# Patient Record
Sex: FEMALE | Race: WHITE | Employment: OTHER | URBAN - METROPOLITAN AREA
[De-identification: names, ages, dates, MRNs, and addresses within clinical notes are randomized per-mention and may not be internally consistent; named-entity substitution may affect disease eponyms.]

---

## 2018-01-23 PROBLEM — M54.2 NECK PAIN: Status: ACTIVE | Noted: 2018-01-23

## 2018-01-23 PROBLEM — M54.50 CHRONIC BILATERAL LOW BACK PAIN WITHOUT SCIATICA: Status: ACTIVE | Noted: 2018-01-23

## 2018-01-23 PROBLEM — G89.29 CHRONIC BILATERAL LOW BACK PAIN WITHOUT SCIATICA: Status: ACTIVE | Noted: 2018-01-23

## 2019-06-10 ENCOUNTER — TELEPHONE (OUTPATIENT)
Dept: ADMINISTRATIVE | Age: 59
End: 2019-06-10

## 2019-06-10 NOTE — TELEPHONE ENCOUNTER
Patient called to schedule appointment with Dr. Leon Turner for right hip pain. Call transferred to [facility].

## 2019-06-11 NOTE — TELEPHONE ENCOUNTER
Patient called back to schedule appointment with Dr. Ezequiel Jane for right hip pain. Call transferred to [facility].

## 2019-06-17 DIAGNOSIS — M25.551 CHRONIC RIGHT HIP PAIN: Primary | ICD-10-CM

## 2019-06-17 DIAGNOSIS — G89.29 CHRONIC RIGHT HIP PAIN: Primary | ICD-10-CM

## 2019-06-18 ENCOUNTER — OFFICE VISIT (OUTPATIENT)
Dept: ORTHOPEDIC SURGERY | Age: 59
End: 2019-06-18
Payer: MEDICARE

## 2019-06-18 ENCOUNTER — HOSPITAL ENCOUNTER (OUTPATIENT)
Dept: GENERAL RADIOLOGY | Age: 59
Discharge: HOME | End: 2019-06-20
Payer: MEDICARE

## 2019-06-18 VITALS
HEART RATE: 57 BPM | DIASTOLIC BLOOD PRESSURE: 89 MMHG | BODY MASS INDEX: 49.69 KG/M2 | WEIGHT: 270 LBS | HEIGHT: 62 IN | SYSTOLIC BLOOD PRESSURE: 167 MMHG

## 2019-06-18 DIAGNOSIS — M51.36 LUMBAR DEGENERATIVE DISC DISEASE: ICD-10-CM

## 2019-06-18 DIAGNOSIS — M25.551 RIGHT HIP PAIN: ICD-10-CM

## 2019-06-18 DIAGNOSIS — G89.29 CHRONIC RIGHT HIP PAIN: ICD-10-CM

## 2019-06-18 DIAGNOSIS — M70.61 GREATER TROCHANTERIC BURSITIS OF RIGHT HIP: Primary | ICD-10-CM

## 2019-06-18 DIAGNOSIS — M54.40 ACUTE RIGHT-SIDED LOW BACK PAIN WITH SCIATICA, SCIATICA LATERALITY UNSPECIFIED: ICD-10-CM

## 2019-06-18 DIAGNOSIS — M25.551 CHRONIC RIGHT HIP PAIN: ICD-10-CM

## 2019-06-18 PROCEDURE — 99213 OFFICE O/P EST LOW 20 MIN: CPT | Performed by: PHYSICIAN ASSISTANT

## 2019-06-18 PROCEDURE — 3017F COLORECTAL CA SCREEN DOC REV: CPT | Performed by: PHYSICIAN ASSISTANT

## 2019-06-18 PROCEDURE — 2500000003 HC RX 250 WO HCPCS

## 2019-06-18 PROCEDURE — 20610 DRAIN/INJ JOINT/BURSA W/O US: CPT | Performed by: PHYSICIAN ASSISTANT

## 2019-06-18 PROCEDURE — 73502 X-RAY EXAM HIP UNI 2-3 VIEWS: CPT

## 2019-06-18 PROCEDURE — 1036F TOBACCO NON-USER: CPT | Performed by: PHYSICIAN ASSISTANT

## 2019-06-18 PROCEDURE — G8417 CALC BMI ABV UP PARAM F/U: HCPCS | Performed by: PHYSICIAN ASSISTANT

## 2019-06-18 PROCEDURE — 99212 OFFICE O/P EST SF 10 MIN: CPT

## 2019-06-18 PROCEDURE — 6360000002 HC RX W HCPCS

## 2019-06-18 PROCEDURE — G8427 DOCREV CUR MEDS BY ELIG CLIN: HCPCS | Performed by: PHYSICIAN ASSISTANT

## 2019-06-18 RX ORDER — MAGNESIUM GLUCONATE 27 MG(500)
1000 TABLET ORAL 2 TIMES DAILY
COMMUNITY
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
## 2019-06-19 RX ORDER — TRIAMCINOLONE ACETONIDE 40 MG/ML
40 INJECTION, SUSPENSION INTRA-ARTICULAR; INTRAMUSCULAR ONCE
Status: COMPLETED | OUTPATIENT
Start: 2019-06-19 | End: 2019-06-19

## 2019-06-19 RX ORDER — LIDOCAINE HYDROCHLORIDE 10 MG/ML
4 INJECTION, SOLUTION INFILTRATION; PERINEURAL ONCE
Status: COMPLETED | OUTPATIENT
Start: 2019-06-19 | End: 2019-06-19

## 2019-06-19 RX ORDER — BUPIVACAINE HYDROCHLORIDE 2.5 MG/ML
4 INJECTION, SOLUTION EPIDURAL; INFILTRATION; INTRACAUDAL ONCE
Status: COMPLETED | OUTPATIENT
Start: 2019-06-19 | End: 2019-06-19

## 2019-06-19 RX ADMIN — LIDOCAINE HYDROCHLORIDE 4 ML: 10 INJECTION, SOLUTION INFILTRATION; PERINEURAL at 15:27

## 2019-06-19 RX ADMIN — BUPIVACAINE HYDROCHLORIDE 10 MG: 2.5 INJECTION, SOLUTION EPIDURAL; INFILTRATION; INTRACAUDAL at 15:26

## 2019-06-19 RX ADMIN — TRIAMCINOLONE ACETONIDE 40 MG: 40 INJECTION, SUSPENSION INTRA-ARTICULAR; INTRAMUSCULAR at 15:28

## 2019-07-22 ENCOUNTER — OFFICE VISIT (OUTPATIENT)
Dept: PHYSICAL MEDICINE AND REHAB | Age: 59
End: 2019-07-22
Payer: MEDICARE

## 2019-07-22 VITALS
BODY MASS INDEX: 50.61 KG/M2 | SYSTOLIC BLOOD PRESSURE: 128 MMHG | HEIGHT: 62 IN | HEART RATE: 56 BPM | DIASTOLIC BLOOD PRESSURE: 74 MMHG | WEIGHT: 275 LBS

## 2019-07-22 DIAGNOSIS — M70.61 TROCHANTERIC BURSITIS OF RIGHT HIP: ICD-10-CM

## 2019-07-22 DIAGNOSIS — M21.70 LEG LENGTH DISCREPANCY: ICD-10-CM

## 2019-07-22 DIAGNOSIS — M54.50 CHRONIC BILATERAL LOW BACK PAIN WITHOUT SCIATICA: Primary | ICD-10-CM

## 2019-07-22 DIAGNOSIS — E66.01 OBESITY, MORBID, BMI 50 OR HIGHER (HCC): ICD-10-CM

## 2019-07-22 DIAGNOSIS — G89.29 CHRONIC BILATERAL LOW BACK PAIN WITHOUT SCIATICA: Primary | ICD-10-CM

## 2019-07-22 PROCEDURE — 3017F COLORECTAL CA SCREEN DOC REV: CPT | Performed by: PHYSICAL MEDICINE & REHABILITATION

## 2019-07-22 PROCEDURE — G8427 DOCREV CUR MEDS BY ELIG CLIN: HCPCS | Performed by: PHYSICAL MEDICINE & REHABILITATION

## 2019-07-22 PROCEDURE — 1036F TOBACCO NON-USER: CPT | Performed by: PHYSICAL MEDICINE & REHABILITATION

## 2019-07-22 PROCEDURE — G8417 CALC BMI ABV UP PARAM F/U: HCPCS | Performed by: PHYSICAL MEDICINE & REHABILITATION

## 2019-07-22 PROCEDURE — 99204 OFFICE O/P NEW MOD 45 MIN: CPT | Performed by: PHYSICAL MEDICINE & REHABILITATION

## 2019-07-22 RX ORDER — CITALOPRAM 40 MG/1
40 TABLET ORAL DAILY
COMMUNITY

## 2019-07-22 RX ORDER — OMEPRAZOLE 40 MG/1
40 CAPSULE, DELAYED RELEASE ORAL DAILY
COMMUNITY

## 2019-07-22 RX ORDER — ALPRAZOLAM 0.25 MG/1
0.25 TABLET ORAL 4 TIMES DAILY PRN
COMMUNITY

## 2019-07-23 ENCOUNTER — TELEPHONE (OUTPATIENT)
Dept: PHYSICAL MEDICINE AND REHAB | Age: 59
End: 2019-07-23

## 2019-07-23 NOTE — TELEPHONE ENCOUNTER
----- Message from Shawna Stiles DO sent at 7/23/2019  8:52 AM EDT -----  Test results are essentially normal please notify patient. There is only a 1 mm shortening of the left leg which is not significant.

## 2020-02-04 ENCOUNTER — OFFICE VISIT (OUTPATIENT)
Dept: ORTHOPEDIC SURGERY | Age: 60
End: 2020-02-04
Payer: MEDICARE

## 2020-02-04 ENCOUNTER — HOSPITAL ENCOUNTER (OUTPATIENT)
Dept: GENERAL RADIOLOGY | Age: 60
Discharge: HOME | End: 2020-02-06
Payer: MEDICARE

## 2020-02-04 VITALS
HEIGHT: 61 IN | DIASTOLIC BLOOD PRESSURE: 88 MMHG | HEART RATE: 65 BPM | SYSTOLIC BLOOD PRESSURE: 168 MMHG | BODY MASS INDEX: 50.98 KG/M2 | WEIGHT: 270 LBS

## 2020-02-04 PROCEDURE — G8484 FLU IMMUNIZE NO ADMIN: HCPCS | Performed by: NURSE PRACTITIONER

## 2020-02-04 PROCEDURE — 6360000002 HC RX W HCPCS

## 2020-02-04 PROCEDURE — 1036F TOBACCO NON-USER: CPT | Performed by: NURSE PRACTITIONER

## 2020-02-04 PROCEDURE — 3017F COLORECTAL CA SCREEN DOC REV: CPT | Performed by: NURSE PRACTITIONER

## 2020-02-04 PROCEDURE — G8417 CALC BMI ABV UP PARAM F/U: HCPCS | Performed by: NURSE PRACTITIONER

## 2020-02-04 PROCEDURE — 20611 DRAIN/INJ JOINT/BURSA W/US: CPT

## 2020-02-04 PROCEDURE — 2500000003 HC RX 250 WO HCPCS

## 2020-02-04 PROCEDURE — 99213 OFFICE O/P EST LOW 20 MIN: CPT | Performed by: NURSE PRACTITIONER

## 2020-02-04 PROCEDURE — 99213 OFFICE O/P EST LOW 20 MIN: CPT | Performed by: ORTHOPAEDIC SURGERY

## 2020-02-04 PROCEDURE — G8428 CUR MEDS NOT DOCUMENT: HCPCS | Performed by: NURSE PRACTITIONER

## 2020-02-04 PROCEDURE — 73502 X-RAY EXAM HIP UNI 2-3 VIEWS: CPT

## 2020-02-04 RX ORDER — LIDOCAINE HYDROCHLORIDE 10 MG/ML
3 INJECTION, SOLUTION INFILTRATION; PERINEURAL ONCE
Status: COMPLETED | OUTPATIENT
Start: 2020-02-04 | End: 2020-02-04

## 2020-02-04 RX ORDER — BUPIVACAINE HYDROCHLORIDE 2.5 MG/ML
3 INJECTION, SOLUTION EPIDURAL; INFILTRATION; INTRACAUDAL ONCE
Status: COMPLETED | OUTPATIENT
Start: 2020-02-04 | End: 2020-02-04

## 2020-02-04 RX ORDER — TRIAMCINOLONE ACETONIDE 40 MG/ML
40 INJECTION, SUSPENSION INTRA-ARTICULAR; INTRAMUSCULAR ONCE
Status: COMPLETED | OUTPATIENT
Start: 2020-02-04 | End: 2020-02-04

## 2020-02-04 RX ADMIN — TRIAMCINOLONE ACETONIDE 40 MG: 40 INJECTION, SUSPENSION INTRA-ARTICULAR; INTRAMUSCULAR at 15:50

## 2020-02-04 RX ADMIN — BUPIVACAINE HYDROCHLORIDE 7.5 MG: 2.5 INJECTION, SOLUTION EPIDURAL; INFILTRATION; INTRACAUDAL at 15:49

## 2020-02-04 RX ADMIN — LIDOCAINE HYDROCHLORIDE 3 ML: 10 INJECTION, SOLUTION INFILTRATION; PERINEURAL at 15:49

## 2020-02-04 NOTE — PROGRESS NOTES
Assisted Erika VELASCO CNP with steroid injection of 3mL marcaine 0.25%, 3mL lidocaine 1%, 1cc Kenalog 40mg/mL into right hip. Pt tolerated procedure well. Verbal instructions given and questions answered.
Spent significant amount of time educating patient on high blood pressure. Patient admits that she checks her blood pressure frequently, and systolic often runs in the 200s. Explained to patient the importance of following up with her PCP and cardiologist. She states she sees Dr. Lynn Price in [place], [place]. She claims she has not seen cardiology or her PCP in a while. Patient disclosed that she is a diabetic but not taking her prescribed medications. Patient also admitted that her doctor wanted to put her on a statin for high cholesterol, but she does not want to take these either. Educated patient of the risk factors associated with her medical history. Highly encouraged patient to follow up with PCP and cardiology. Patient agreeable with plan. Call placed to patient's cardiologist at [phone]. No answer, left message on the nurse line to request call back. Call placed to patient's PCP. Spoke to Dr. Ritchie Ross nurse. She stated that the patient sees Mary Mcgill, and provided the phone number [phone]. Spoke to nurse FRANCISCO. Notified her of above information. She stated she will call patient tomorrow to schedule appointment. Cardiology office returned call. Spoke to Damon Serrato. Advised Stephanie of elevated blood pressures.
headaches. Psychiatric/Behavioral: Negative. OBJECTIVE:      Physical Examination:   General appearance: alert, well appearing, and in no distress,  normal appearing weight. No visible signs of trauma   Mental status: alert, oriented to person, place, and time, normal mood, behavior, speech, dress, motor activity, and thought processes  Abdomen: soft, nondistended  Resp:   resp easy and unlabored, no audible wheezes note, normal symmetrical expansion of both hemithoraces  Cardiac: distal pulses palpable, skin and extremities well perfused  Neurological: alert, oriented X3, normal speech, no focal findings or movement disorder noted, motor and sensory grossly normal bilaterally, normal muscle tone, no tremors, strength 5/5, normal gait and station  HEENT: normochephalic atraumatic, external ears and eyes normal, sclera normal, neck supple, no nasal discharge. Extremities:   peripheral pulses normal, no edema, redness or tenderness in the calves   Skin: normal coloration, no rashes or open wounds, no suspicious skin lesions noted  Psych: Affect euthymic   Musculoskeletal:   Extremity:  Right Lower Extremity  Skin clean dry and intact, without signs of infection  Mild edema noted to the right knee, no effusion  Compartments supple throughout thigh and leg,   Tender to palpation over the right trochanteric bursa, no fluctuance  Calf supple and nontender to palpation  Demonstrates active knee flexion/extension 0 to 100 degrees, ankle plantar/dorsiflexion/great toe extension. States sensation intact to touch in sural/deep peroneal/superficial peroneal/saphenous/posterior tibial nerve distributions to foot/ankle. Palpable dorsalis pedis and posterior tibialis pulses, cap refill brisk in toes, foot warm/perfused. BP (!) 168/88   Pulse 65   Ht 5' 1\" (1.549 m)   Wt 270 lb (122.5 kg)   BMI 51.02 kg/m²      XR: 2/4/20 x-rays of the right hip and pelvis demonstrating mild to moderate degenerative changes.

## 2020-02-04 NOTE — PATIENT INSTRUCTIONS
Continue weight bearing as tolerated  Trochanteric bursa injection to the right hip today  Will schedule an IR injection to the right hip, will need to be at least 2 weeks out from the injection today. Please follow up  With your primary care physician and cardiologist in regards to your BP today in the office  Follow up in 3 months  Call sooner with any problems or concerns    Patient Education        High Blood Pressure: Care Instructions  Overview    It's normal for blood pressure to go up and down throughout the day. But if it stays up, you have high blood pressure. Another name for high blood pressure is hypertension. Despite what a lot of people think, high blood pressure usually doesn't cause headaches or make you feel dizzy or lightheaded. It usually has no symptoms. But it does increase your risk of stroke, heart attack, and other problems. You and your doctor will talk about your risks of these problems based on your blood pressure. Your doctor will give you a goal for your blood pressure. Your goal will be based on your health and your age. Lifestyle changes, such as eating healthy and being active, are always important to help lower blood pressure. You might also take medicine to reach your blood pressure goal.  Follow-up care is a key part of your treatment and safety. Be sure to make and go to all appointments, and call your doctor if you are having problems. It's also a good idea to know your test results and keep a list of the medicines you take. How can you care for yourself at home? Medical treatment  · If you stop taking your medicine, your blood pressure will go back up. You may take one or more types of medicine to lower your blood pressure. Be safe with medicines. Take your medicine exactly as prescribed. Call your doctor if you think you are having a problem with your medicine. · Talk to your doctor before you start taking aspirin every day.  Aspirin can help certain people lower their example, call  911 if:    · You have symptoms of a heart attack. These may include:  ? Chest pain or pressure, or a strange feeling in the chest.  ? Sweating. ? Shortness of breath. ? Nausea or vomiting. ? Pain, pressure, or a strange feeling in the back, neck, jaw, or upper belly or in one or both shoulders or arms. ? Lightheadedness or sudden weakness. ? A fast or irregular heartbeat.     · You have symptoms of a stroke. These may include:  ? Sudden numbness, tingling, weakness, or loss of movement in your face, arm, or leg, especially on only one side of your body. ? Sudden vision changes. ? Sudden trouble speaking. ? Sudden confusion or trouble understanding simple statements. ? Sudden problems with walking or balance. ? A sudden, severe headache that is different from past headaches.     · You have severe back or belly pain.    Do not wait until your blood pressure comes down on its own. Get help right away.   Call your doctor now or seek immediate care if:    · Your blood pressure is much higher than normal (such as 180/120 or higher), but you don't have symptoms.     · You think high blood pressure is causing symptoms, such as:  ? Severe headache.  ? Blurry vision.    Watch closely for changes in your health, and be sure to contact your doctor if:    · Your blood pressure measures higher than your doctor recommends at least 2 times. That means the top number is higher or the bottom number is higher, or both.     · You think you may be having side effects from your blood pressure medicine. Where can you learn more? Go to [email or web address] org and sign in to your [employer] account. Enter D315 in the [employer] box to learn more about \"High Blood Pressure: Care Instructions. \"     If you do not have an account, please click on the \"Sign Up Now\" link. Current as of: April 9, 2019  Content Version: 12.3  © 2110-6705 [employer].  Care instructions adapted

## 2020-02-19 ENCOUNTER — HOSPITAL ENCOUNTER (OUTPATIENT)
Dept: INTERVENTIONAL RADIOLOGY/VASCULAR | Age: 60
Discharge: HOME | End: 2020-02-21
Payer: MEDICARE

## 2020-02-19 VITALS
TEMPERATURE: 97.7 F | SYSTOLIC BLOOD PRESSURE: 158 MMHG | DIASTOLIC BLOOD PRESSURE: 63 MMHG | OXYGEN SATURATION: 95 % | RESPIRATION RATE: 18 BRPM | HEART RATE: 64 BPM

## 2020-02-19 PROCEDURE — 20610 DRAIN/INJ JOINT/BURSA W/O US: CPT

## 2020-02-19 PROCEDURE — 77002 NEEDLE LOCALIZATION BY XRAY: CPT

## 2020-02-19 PROCEDURE — 7100000010 HC PHASE II RECOVERY - FIRST 15 MIN

## 2020-02-19 PROCEDURE — 6360000004 HC RX CONTRAST MEDICATION: Performed by: PHYSICIAN ASSISTANT

## 2020-02-19 PROCEDURE — 7100000011 HC PHASE II RECOVERY - ADDTL 15 MIN

## 2020-02-19 PROCEDURE — 2500000003 HC RX 250 WO HCPCS: Performed by: PHYSICIAN ASSISTANT

## 2020-02-19 PROCEDURE — 6360000002 HC RX W HCPCS: Performed by: PHYSICIAN ASSISTANT

## 2020-02-19 RX ORDER — TRIAMCINOLONE ACETONIDE 40 MG/ML
INJECTION, SUSPENSION INTRA-ARTICULAR; INTRAMUSCULAR
Status: COMPLETED | OUTPATIENT
Start: 2020-02-19 | End: 2020-02-19

## 2020-02-19 RX ORDER — LIDOCAINE HYDROCHLORIDE 10 MG/ML
INJECTION, SOLUTION INFILTRATION; PERINEURAL
Status: COMPLETED | OUTPATIENT
Start: 2020-02-19 | End: 2020-02-19

## 2020-02-19 RX ORDER — LIDOCAINE HYDROCHLORIDE 20 MG/ML
INJECTION, SOLUTION INFILTRATION; PERINEURAL
Status: COMPLETED | OUTPATIENT
Start: 2020-02-19 | End: 2020-02-19

## 2020-02-19 RX ORDER — BUPIVACAINE HYDROCHLORIDE 2.5 MG/ML
INJECTION, SOLUTION EPIDURAL; INFILTRATION; INTRACAUDAL
Status: COMPLETED | OUTPATIENT
Start: 2020-02-19 | End: 2020-02-19

## 2020-02-19 RX ADMIN — TRIAMCINOLONE ACETONIDE 40 MG: 40 INJECTION, SUSPENSION INTRA-ARTICULAR; INTRAMUSCULAR at 12:37

## 2020-02-19 RX ADMIN — LIDOCAINE HYDROCHLORIDE 1 ML: 10 INJECTION, SOLUTION INFILTRATION; PERINEURAL at 12:37

## 2020-02-19 RX ADMIN — IOPAMIDOL 2 ML: 612 INJECTION, SOLUTION INTRAVENOUS at 12:39

## 2020-02-19 RX ADMIN — BUPIVACAINE HYDROCHLORIDE 1 ML: 2.5 INJECTION, SOLUTION EPIDURAL; INFILTRATION; INTRACAUDAL; PERINEURAL at 12:37

## 2020-02-19 RX ADMIN — LIDOCAINE HYDROCHLORIDE 13 ML: 20 INJECTION, SOLUTION INFILTRATION; PERINEURAL at 12:34

## 2020-02-19 ASSESSMENT — PAIN - FUNCTIONAL ASSESSMENT
PAIN_FUNCTIONAL_ASSESSMENT: 0-10

## 2020-02-19 ASSESSMENT — PAIN DESCRIPTION - DESCRIPTORS: DESCRIPTORS: CONSTANT;NAGGING;DISCOMFORT

## 2020-02-19 NOTE — BRIEF OP NOTE
Brief Postoperative Note  ______________________________________________________________      Interventional Radiology Joint Injection Brief Procedure Note    Patient Name: Nikhil Lara   YOB: 1960  Medical Record Number: [id number]  Date of Procedure: 2/19/20  Room/Bed: Room/bed info not found    Preoperative diagnosis: right Hip Joint pain. Postoperative diagnosis: Same. Procedure: Arthrography and fluoroscopic-guided therapeutic injection of right Hip Joint    Anesthesia: Approximately 10 mL lidocaine administered subcutaneously. Performed by: TRENTON Ireland under on-site supervision by Gold Oliveros II, MD    Estimated blood loss: Minimal    Complications: None    Implants: None    Procedure details: The site was prepped and draped in sterile fashion. A preprocedure timeout was called. After selecting and marking a suitable access site on the skin, 1% lidocaine was subcutaneously administered for local anesthesia. Under fluoroscopic guidance, a 20-gauge spinal needle was inserted into the right Hip Joint capsule. Iodinated contrast was injected confirming needle tip position within the joint. Through the needle, Kenalog (40mg/ml) 1.0 mL, Marcaine 0.25% 1.0 mL, and lidocaine 1% 1.0 mL were injected into the joint. The needle was removed, and the puncture site was covered with a dressing. There were no complications.     Electronically signed by TRENTON Ireland   DD: 2/19/20  3:57 PM

## 2020-07-23 ENCOUNTER — TELEPHONE (OUTPATIENT)
Dept: ADMINISTRATIVE | Age: 60
End: 2020-07-23

## 2020-07-23 NOTE — TELEPHONE ENCOUNTER
Patient called stated she had a referral sent over for her to see Dr. Irineo Carl for a torn left meniscus, I did not see referral in her chart, she can be reached at [phone].

## 2020-07-23 NOTE — TELEPHONE ENCOUNTER
Attempted to call patient. No answer, left voicemail. Patient established with Dr. Leah Vuong,maria elena to schedule on next available.

## 2020-07-28 ENCOUNTER — OFFICE VISIT (OUTPATIENT)
Dept: ORTHOPEDIC SURGERY | Age: 60
End: 2020-07-28
Payer: MEDICARE

## 2020-07-28 ENCOUNTER — HOSPITAL ENCOUNTER (OUTPATIENT)
Dept: GENERAL RADIOLOGY | Age: 60
Discharge: HOME | End: 2020-07-30
Payer: MEDICARE

## 2020-07-28 VITALS — HEIGHT: 62 IN | TEMPERATURE: 98.2 F | WEIGHT: 276 LBS | BODY MASS INDEX: 50.79 KG/M2

## 2020-07-28 PROCEDURE — 3017F COLORECTAL CA SCREEN DOC REV: CPT | Performed by: PHYSICIAN ASSISTANT

## 2020-07-28 PROCEDURE — G8427 DOCREV CUR MEDS BY ELIG CLIN: HCPCS | Performed by: PHYSICIAN ASSISTANT

## 2020-07-28 PROCEDURE — 99213 OFFICE O/P EST LOW 20 MIN: CPT | Performed by: PHYSICIAN ASSISTANT

## 2020-07-28 PROCEDURE — 99212 OFFICE O/P EST SF 10 MIN: CPT | Performed by: PHYSICIAN ASSISTANT

## 2020-07-28 PROCEDURE — G8417 CALC BMI ABV UP PARAM F/U: HCPCS | Performed by: PHYSICIAN ASSISTANT

## 2020-07-28 PROCEDURE — 73560 X-RAY EXAM OF KNEE 1 OR 2: CPT

## 2020-07-28 PROCEDURE — 1036F TOBACCO NON-USER: CPT | Performed by: PHYSICIAN ASSISTANT

## 2020-07-30 NOTE — PROGRESS NOTES
Chief Complaint   Patient presents with    Pain      Follow up Left torn meniscus. Referred by PCP. Reports pain outer lateral aspect. Maintains ace wrap during waking hours. Tony Callahan is an 60 y.o. female who presents to the office today for left knee pain. Patient previously established with Dr. Jewel Mohan for previous right foot trauma, underwent right subtalar fusion. Patient has been having worsening left knee pain for several months, patient describes this as a ripping and tearing sensation to the outside of the knee, worse with activity and weightbearing. Patient does reports mechanical symptoms to the knee but no sensation of locking or giving out. Patient has tried acetaminophen and her gabapentin for pain control without relief. Patient has been wearing ACE wrap to the left knee for support. Presents without assistive device in the office today. Patient states her pain management provider has injected the knee which gave mild relief but this was very short lived. Review of Systems -   General ROS: negative for - chills, fatigue, fever or night sweats  Respiratory ROS: no cough, shortness of breath, or wheezing  Cardiovascular ROS: no chest pain or dyspnea on exertion  Gastrointestinal ROS: no abdominal pain, change in bowel habits, or black or bloody stools  Genitourinary: no hematuria, dysuria, or incontinence   Musculoskeletal ROS:see above  Neurological ROS: no TIA or stroke symptoms       OBJECTIVE:   Alert and oriented X 3, no acute distress, respirations easy and unlabored with no audible wheezes, skin warm and dry, speech and dress appropriate for noted age, affect euthymic. Extremity:  Right Knee exam  Skin intact. No erythema/induration/fluctuence at knee. No effusion noted  Negative ballotment  Patella tracks normally  Negative patellar grind test and  Negative J sign.    Mild crepitus with flexion and extension of the knee  lateral joint line pain with palpation  Stable to varus and valgus at 0 and 30 degrees of flexion. Negative McMurrays for mechanical symptoms but reproduces pain  1+ laxity with anterior drawer compared with contralateral side, negative posterior drawer  Active range of motion 0-110 without pain. Compartments soft and compressible throughout leg. Calf soft and nontender with palpation    Demonstrates active ankle plantar/dorsiflexion/great toe extension. Sensation intact to light touch sural/deep peroneal/superficial peroneal/saphenous/posterior tibial nerve distributions to foot/ankle. Palpable dorsalis pedis and posterior tibialis pulses. XR: 7/28/20 AP/lateral WB views of the left knee demonstrates no acute fracture or dislocation, there is no notable joint space narrowing or degenerative changes    Temp 98.2 °F (36.8 °C)   Ht 5' 2\" (1.575 m)   Wt 276 lb (125.2 kg)   BMI 50.48 kg/m²     ASSESSMENT:     Diagnosis Orders   1. Knee instability, left  MRI KNEE LEFT WO CONTRAST       PLAN:  Patient seen with Dr. Kwabena Monge today  Discussed symptoms reported as well as sensation of instability on exam today  Discussed has tried conservative treatments including injection  Discussed MRI of the knee to further evaluate, if knee arthroscopy indicated will refer to partner   In the mean time continue ACE wrap for support and compression, NSAID/Acetaminophen PRN  Will call with MRI results    Electronically signed by Ilsa Swain PA-C on 7/30/2020 at 10:41 AM  Note: This report was completed using computerize voiced recognition software. Every effort has been made to ensure accuracy; however, inadvertent computerized transcription errors may be present.

## 2020-08-11 ENCOUNTER — TELEPHONE (OUTPATIENT)
Dept: ORTHOPEDIC SURGERY | Age: 60
End: 2020-08-11

## 2020-08-11 RX ORDER — ACETAMINOPHEN 500 MG
1000 TABLET ORAL 3 TIMES DAILY PRN
Qty: 180 TABLET | Refills: 0 | Status: SHIPPED | OUTPATIENT
Start: 2020-08-11 | End: 2020-09-10

## 2020-08-13 ENCOUNTER — HOSPITAL ENCOUNTER (OUTPATIENT)
Dept: MRI IMAGING | Age: 60
Discharge: HOME | End: 2020-08-15
Payer: MEDICARE

## 2020-08-13 PROCEDURE — 73721 MRI JNT OF LWR EXTRE W/O DYE: CPT

## 2020-08-13 NOTE — TELEPHONE ENCOUNTER
Call placed to pt. To verify that medication was obtained  that she had requested. CHINYERE at this time.

## 2020-08-14 ENCOUNTER — TELEPHONE (OUTPATIENT)
Dept: ORTHOPEDIC SURGERY | Age: 60
End: 2020-08-14

## 2020-08-14 NOTE — TELEPHONE ENCOUNTER
Spoke to patient. Discussed results of the MRI and referral to Dr. Nayan Moon. Patient agreeable to plan.      Electronically signed by VENANCIO Ocampo CNP on 8/14/2020 at 3:27 PM

## 2020-08-14 NOTE — TELEPHONE ENCOUNTER
Elissa Severance called and she had her MRI done yesterday; she is calling for the results.        Electronically signed by Sravan Elizondo on 8/14/2020 at 3:02 PM

## 2020-09-02 ENCOUNTER — OFFICE VISIT (OUTPATIENT)
Dept: ORTHOPEDIC SURGERY | Age: 60
End: 2020-09-02
Payer: MEDICARE

## 2020-09-02 VITALS — TEMPERATURE: 97.8 F | WEIGHT: 270 LBS | HEIGHT: 62 IN | BODY MASS INDEX: 49.69 KG/M2

## 2020-09-02 PROCEDURE — 3017F COLORECTAL CA SCREEN DOC REV: CPT | Performed by: ORTHOPAEDIC SURGERY

## 2020-09-02 PROCEDURE — 1036F TOBACCO NON-USER: CPT | Performed by: ORTHOPAEDIC SURGERY

## 2020-09-02 PROCEDURE — G8428 CUR MEDS NOT DOCUMENT: HCPCS | Performed by: ORTHOPAEDIC SURGERY

## 2020-09-02 PROCEDURE — 99203 OFFICE O/P NEW LOW 30 MIN: CPT | Performed by: ORTHOPAEDIC SURGERY

## 2020-09-02 PROCEDURE — G8417 CALC BMI ABV UP PARAM F/U: HCPCS | Performed by: ORTHOPAEDIC SURGERY

## 2020-09-02 RX ORDER — HYDROCODONE BITARTRATE AND ACETAMINOPHEN 5; 325 MG/1; MG/1
TABLET ORAL
COMMUNITY
Start: 2020-08-21 | End: 2021-09-29

## 2020-09-02 NOTE — PROGRESS NOTES
Chief Complaint   Patient presents with    Knee Pain     ( L ) knee, bothered pt for several years. Pain since July has been significant, saw Ortho Trauma. Had a cortisone inj from her pain clinic dr last year with minimal relief. C/o sharp pain, instability when needing to pivot     Results     MRI in chart        SUBJECTIVE: Kristina Galvin is a 60 y.o. female who presents to office due to the above chief complaint. She reports that at the beginning of July 2020 she stepped wrong and immediately felt pain in her left knee. She sought evaluation at the emergency department where an initial work-up was performed. She was referred to her PCP and Ortho trauma and was subsequently referred to our clinic for evaluation. She reports that her knee has progressively been improving since July and has been doing particularly well this past week. She is mainly been using an over-the-counter ointment for pain relief. Denies numbness, paresthesias, or any other orthopedic complaints this time. Review of Systems   Constitutional: Negative for fever, chills, diaphoresis, appetite change and fatigue. HENT: Negative for dental issues, hearing loss and tinnitus. Negative for congestion, sinus pressure, sneezing, sore throat. Negative for headache. Eyes: Negative for visual disturbance, blurred and double vision. Negative for pain, discharge, redness and itching  Respiratory: Negative for cough, shortness of breath and wheezing. Cardiovascular: Negative for chest pain, palpitations and leg swelling. No dyspnea on exertion   Gastrointestinal:   Negative for nausea, vomiting, abdominal pain, diarrhea, constipation  or black or bloody. Hematologic\Lymphatic:  negative for bleeding, petechiae,   Genitourinary: Negative for hematuria and difficulty urinating. Musculoskeletal: Negative for neck pain and stiffness. Negative for back pain, see HPI  Skin: Negative for pallor, rash and wound.    Neurological: Negative for dizziness, tremors, seizures, weakness, light-headedness, no TIA or stroke symptoms. No numbness and headaches. Psychiatric/Behavioral: Negative. OBJECTIVE:      Physical Examination:   General appearance: alert, well appearing, and in no distress,  normal appearing weight. No visible signs of trauma   Mental status: alert, oriented to person, place, and time, normal mood, behavior, speech, dress, motor activity, and thought processes  Abdomen: soft, nondistended  Resp:   resp easy and unlabored, no audible wheezes note, normal symmetrical expansion of both hemithoraces  Cardiac: distal pulses palpable, skin and extremities well perfused  Neurological: alert, oriented X3, normal speech, no focal findings or movement disorder noted, motor and sensory grossly normal bilaterally, normal muscle tone, no tremors, strength 5/5, normal gait and station  HEENT: normochephalic atraumatic, external ears and eyes normal, sclera normal, neck supple, no nasal discharge. Extremities:   peripheral pulses normal, no edema, redness or tenderness in the calves   Skin: normal coloration, no rashes or open wounds, no suspicious skin lesions noted  Psych: Affect euthymic   Musculoskeletal:   Extremity:  left lower extremity:  · No joint effusion appreciated  · Compartments soft and compressible  · +PF/DF/EHL  · +2/4 DP & PT pulses, Brisk Cap refill, Toes warm and perfused  · Distal sensation grossly intact to Peroneals, Sural, Saphenous, and tibial nrs  · Knee flexion 4/5  · Knee extension 4/5  · Knee flexion AROM 120 degrees  · Knee extension AROM 0 degrees    Temp 97.8 °F (36.6 °C) (Infrared)   Ht 5' 2\" (1.575 m)   Wt 270 lb (122.5 kg)   BMI 49.38 kg/m²      XR: 9/2/20   X-ray bilateral knee/left knee: ORIF hardware present right knee/lower extremity. No evidence of loosening; hardware appears to be well fixed.   Mild knee joint space narrowing bilaterally with medial joint space narrowing greater than lateral joint space narrowing bilaterally. Mild patellofemoral arthritis of the left knee with osteophytes present anteriorly. No other fractures, dislocations or other osseous abnormalities appreciated. MRI: 8/13/2020  MRI left knee: Radial tear present posterior medial horn of the meniscus. ACL/PCL appear to be intact. mild osteoarthritis present about the knee. ASSESSMENT:     Diagnosis Orders   1. Degenerative tear of posterior horn of medial meniscus, left  XR KNEE BILATERAL STANDING    XR KNEE LEFT (3 VIEWS)   2. Knee instability, left  XR KNEE BILATERAL STANDING    XR KNEE LEFT (3 VIEWS)       Discussion:  Discussed operative versus nonoperative management of meniscal tears. Patient will benefit from initial nonoperative management of her meniscal tear. Should her condition worsen or fail to improve in the coming weeks injections and other conservative management can be discussed as well as surgical management for her condition. All questions were sought and answered at today's visit. PLAN:  Home exercise program left lower extremity  Follow-up in 6 weeks  Discussed with Dr. Brice Mcgraw      Electronically signed by Rosemarie Crane DO on 9/2/2020 at 2:23 PM  Note: This report was completed using computerChubbies Shorts voiced recognition software. Every effort has been made to ensure accuracy; however, inadvertent computerized transcription errors may be present. Staff Addendum    I have seen and evaluated the patient and agree with the assessment and plan as documented by the orthopaedic surgery resident. I have performed the key components of the history and physical examination and concur with the findings and plan, and have made changes where appropriate/necessary.         Scooby Wolf MD  [address] [place] [address]

## 2020-10-03 NOTE — PROGRESS NOTES
levothyroxine (SYNTHROID) 25 MCG tablet Take 25 mcg by mouth Daily.  LORazepam (ATIVAN) 0.5 MG tablet Take 0.5 mg by mouth every 6 hours as needed.  loratadine (CLARITIN REDITABS) 10 MG dissolvable tablet Take 10 mg by mouth daily.  lisinopril (PRINIVIL;ZESTRIL) 20 MG tablet Take 20 mg by mouth daily.  docusate sodium 100 MG CAPS Take 100 mg by mouth 2 times daily. 30 capsule 2    famotidine (PEPCID) 20 MG tablet Take 1 tablet by mouth 2 times daily. 30 tablet 2    metoprolol (LOPRESSOR) 25 MG tablet Take 25 mg by mouth 2 times daily.  furosemide (LASIX) 40 MG tablet Take 40 mg by mouth daily. No current facility-administered medications for this visit. Review of Systems - For review of systems, positive symptoms are underlined and negative findings are not underlined. General: chills, fatigue, fever, malaise, night sweats, weight gain,  weight loss. Psychological: anxiety, depression, suicidal ideation, sleep disturbances, behavioral disorder, difficulty concentrating, disorientation, hallucinations, mood swings, obsessive thoughts, physical abuse,  sexual abuse. Ophthalmic: blurry vision, decreased vision, double vision, loss of vision, photophobia, use of corrective device. Ear Nose Throat: hearing loss, tinnitus, phonophobia, sensitivity to smells, vertigo, or vocal changes. Allergy/Immunology: seasonal allergies, watery eyes, itchy eyes, frequent infections. Hematological and Lymphatic: bleeding problems, blood clots, bruising,  yellowing of the skin, swollen lymph nodes. Endocrine:  polydypsia, polyuria, temperature intolerance. Respiratory: cough, shortness of breath, wheezing. Cardiovascular: syncope, chest pain, dyspnea on exertion, edema, irregular heartbeat,  palpitations. Gastrointestinal: abdominal pain, constipation, diarrhea,  decreased appetite, heartburn, hematemesis, melena, nausea, vomiting, stool incontinence, abnormal swallowing. Genito-Urinary: dysuria,
0

## 2020-10-14 ENCOUNTER — OFFICE VISIT (OUTPATIENT)
Dept: ORTHOPEDIC SURGERY | Age: 60
End: 2020-10-14
Payer: MEDICARE

## 2020-10-14 VITALS — TEMPERATURE: 97.9 F | HEIGHT: 62 IN | BODY MASS INDEX: 49.69 KG/M2 | WEIGHT: 270 LBS

## 2020-10-14 PROCEDURE — G8417 CALC BMI ABV UP PARAM F/U: HCPCS | Performed by: ORTHOPAEDIC SURGERY

## 2020-10-14 PROCEDURE — 99213 OFFICE O/P EST LOW 20 MIN: CPT | Performed by: ORTHOPAEDIC SURGERY

## 2020-10-14 PROCEDURE — 1036F TOBACCO NON-USER: CPT | Performed by: ORTHOPAEDIC SURGERY

## 2020-10-14 PROCEDURE — 3017F COLORECTAL CA SCREEN DOC REV: CPT | Performed by: ORTHOPAEDIC SURGERY

## 2020-10-14 PROCEDURE — G8484 FLU IMMUNIZE NO ADMIN: HCPCS | Performed by: ORTHOPAEDIC SURGERY

## 2020-10-14 PROCEDURE — G8428 CUR MEDS NOT DOCUMENT: HCPCS | Performed by: ORTHOPAEDIC SURGERY

## 2020-10-14 NOTE — PROGRESS NOTES
Chief Complaint   Patient presents with    Follow-up     Degenerative tear of posterior horn of medial meniscus     Knee Pain     ( L ) knee, sees pain management, apparently she has been getting HA injections x 2 weeks, has 1 more. Unable to name the medication. She is also doing PT a couple times a week    Shoulder Pain     ( L ) shoulder, x July. Pain in the joint radiating into the bicep. Having PT done, also had a cortisone inj from her PCP a couple of weeks ago. No numbness/tingling. SUBJECTIVE: Juan Antonio Bui is a 63 y.o. female who presents to office due to the above chief complaint. She continues to have left knee pain, however has seen Pain Management for visco injections into her left knee. She has one more injection this week. She also complains of left shoulder pain today that started about two months ago. Denies numbness, paresthesias, or any other orthopedic complaints this time. Review of Systems   Constitutional: Negative for fever, chills, diaphoresis, appetite change and fatigue. HENT: Negative for dental issues, hearing loss and tinnitus. Negative for congestion, sinus pressure, sneezing, sore throat. Negative for headache. Eyes: Negative for visual disturbance, blurred and double vision. Negative for pain, discharge, redness and itching  Respiratory: Negative for cough, shortness of breath and wheezing. Cardiovascular: Negative for chest pain, palpitations and leg swelling. No dyspnea on exertion   Gastrointestinal:   Negative for nausea, vomiting, abdominal pain, diarrhea, constipation  or black or bloody. Hematologic\Lymphatic:  negative for bleeding, petechiae,   Genitourinary: Negative for hematuria and difficulty urinating. Musculoskeletal: Negative for neck pain and stiffness. Negative for back pain, see HPI  Skin: Negative for pallor, rash and wound. Neurological: Negative for dizziness, tremors, seizures, weakness, light-headedness, no TIA or stroke symptoms. No numbness and headaches. Psychiatric/Behavioral: Negative. OBJECTIVE:      Physical Examination:   General appearance: alert, well appearing, and in no distress,  normal appearing weight. No visible signs of trauma   Mental status: alert, oriented to person, place, and time, normal mood, behavior, speech, dress, motor activity, and thought processes  Abdomen: soft, nondistended  Resp:   resp easy and unlabored, no audible wheezes note, normal symmetrical expansion of both hemithoraces  Cardiac: distal pulses palpable, skin and extremities well perfused  Neurological: alert, oriented X3, normal speech, no focal findings or movement disorder noted, motor and sensory grossly normal bilaterally, normal muscle tone, no tremors, strength 5/5, normal gait and station  HEENT: normochephalic atraumatic, external ears and eyes normal, sclera normal, neck supple, no nasal discharge.    Extremities:   peripheral pulses normal, no edema, redness or tenderness in the calves   Skin: normal coloration, no rashes or open wounds, no suspicious skin lesions noted  Psych: Affect euthymic   Musculoskeletal:   Extremity:  left lower extremity:  · No joint effusion appreciated  · Compartments soft and compressible  · +PF/DF/EHL  · +2/4 DP & PT pulses, Brisk Cap refill, Toes warm and perfused  · Distal sensation grossly intact to Peroneals, Sural, Saphenous, and tibial nrs  · Knee ROM -5 to 110    Left upper extremity:  · Skin intact circumferentially  · +Neer's, +Nunn, +Impingement  · Tenderness over biceps tendon  · Compartments soft and compressible  · +AIN/PIN/Ulnar nerve function intact grossly  · +Radial pulse, Brisk Cap refill, hand warm and perfused  · Sensation intact to touch in radial/ulnar/median nerve distributions to hand        Temp 97.9 °F (36.6 °C) (Infrared)   Ht 5' 2\" (1.575 m)   Wt 270 lb (122.5 kg)   BMI 49.38 kg/m²      XR: 10/14/20  Left shoulder Xray- demonstrates no acute fractures, minimal degenerative changes, minimal sclerosis     ASSESSMENT:     Diagnosis Orders   1. Left shoulder pain, unspecified chronicity  XR SHOULDER LEFT (MIN 2 VIEWS)       Discussion:  Discussed operative versus nonoperative management of her left shoulder and knee. She will continue with Pain Management for her last visco injection. She will continue therapy for her left knee and left shoulder. Should her condition worsen or fail to improve in the coming weeks, an MRI can be ordered for her left shoulder. All questions were sought and answered at today's visit. PLAN:  Home exercise program left lower and upper extremity  Call if would like MRI left shoulder  Discussed with Dr. Fritz Deng    Staff Addendum    I have seen and evaluated the patient and agree with the assessment and plan as documented by the orthopaedic surgery resident. I have performed the key components of the history and physical examination and concur with the findings and plan, and have made changes where appropriate/necessary. Agree with above. She is currently doing PT and had an injection of her shoulder. She will finish out the PT. If her symptoms persist or worsen we will order an MRI of the left shoulder.       Yair Amaya MD  [address]

## 2020-10-20 ENCOUNTER — TELEPHONE (OUTPATIENT)
Dept: ORTHOPEDIC SURGERY | Age: 60
End: 2020-10-20

## 2021-08-27 ENCOUNTER — TELEPHONE (OUTPATIENT)
Dept: ORTHOPEDIC SURGERY | Age: 61
End: 2021-08-27

## 2021-08-27 DIAGNOSIS — M25.551 RIGHT HIP PAIN: Primary | ICD-10-CM

## 2021-09-20 ENCOUNTER — TELEPHONE (OUTPATIENT)
Dept: ORTHOPEDIC SURGERY | Age: 61
End: 2021-09-20

## 2021-09-24 ENCOUNTER — TELEPHONE (OUTPATIENT)
Dept: ORTHOPEDIC SURGERY | Age: 61
End: 2021-09-24

## 2021-09-24 DIAGNOSIS — M79.671 RIGHT FOOT PAIN: Primary | ICD-10-CM

## 2021-09-29 ENCOUNTER — OFFICE VISIT (OUTPATIENT)
Dept: ORTHOPEDIC SURGERY | Age: 61
End: 2021-09-29
Payer: MEDICARE

## 2021-09-29 ENCOUNTER — HOSPITAL ENCOUNTER (OUTPATIENT)
Dept: GENERAL RADIOLOGY | Age: 61
Discharge: HOME | End: 2021-10-01
Payer: MEDICARE

## 2021-09-29 VITALS — TEMPERATURE: 98 F

## 2021-09-29 DIAGNOSIS — M79.671 RIGHT FOOT PAIN: ICD-10-CM

## 2021-09-29 DIAGNOSIS — M16.11 PRIMARY OSTEOARTHRITIS OF RIGHT HIP: ICD-10-CM

## 2021-09-29 DIAGNOSIS — M79.671 RIGHT FOOT PAIN: Primary | ICD-10-CM

## 2021-09-29 DIAGNOSIS — M25.551 RIGHT HIP PAIN: ICD-10-CM

## 2021-09-29 PROCEDURE — 1036F TOBACCO NON-USER: CPT | Performed by: ORTHOPAEDIC SURGERY

## 2021-09-29 PROCEDURE — G8417 CALC BMI ABV UP PARAM F/U: HCPCS | Performed by: ORTHOPAEDIC SURGERY

## 2021-09-29 PROCEDURE — 99212 OFFICE O/P EST SF 10 MIN: CPT | Performed by: ORTHOPAEDIC SURGERY

## 2021-09-29 PROCEDURE — 99214 OFFICE O/P EST MOD 30 MIN: CPT | Performed by: ORTHOPAEDIC SURGERY

## 2021-09-29 PROCEDURE — 3017F COLORECTAL CA SCREEN DOC REV: CPT | Performed by: ORTHOPAEDIC SURGERY

## 2021-09-29 PROCEDURE — G8427 DOCREV CUR MEDS BY ELIG CLIN: HCPCS | Performed by: ORTHOPAEDIC SURGERY

## 2021-09-29 PROCEDURE — 73502 X-RAY EXAM HIP UNI 2-3 VIEWS: CPT

## 2021-09-29 PROCEDURE — 73630 X-RAY EXAM OF FOOT: CPT

## 2021-09-29 RX ORDER — MELOXICAM 15 MG/1
15 TABLET ORAL 2 TIMES DAILY
COMMUNITY
Start: 2021-09-23

## 2021-09-29 RX ORDER — BACLOFEN 10 MG/1
5 TABLET ORAL 2 TIMES DAILY PRN
COMMUNITY
Start: 2021-09-14
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
## 2021-11-04 DIAGNOSIS — M25.551 RIGHT HIP PAIN: Primary | ICD-10-CM

## 2021-11-11 ENCOUNTER — HOSPITAL ENCOUNTER (OUTPATIENT)
Dept: INTERVENTIONAL RADIOLOGY/VASCULAR | Age: 61
Discharge: HOME | End: 2021-11-13
Payer: MEDICARE

## 2021-11-11 DIAGNOSIS — M16.11 PRIMARY OSTEOARTHRITIS OF RIGHT HIP: ICD-10-CM

## 2021-11-11 PROCEDURE — 2500000003 HC RX 250 WO HCPCS: Performed by: ORTHOPAEDIC SURGERY

## 2021-11-11 PROCEDURE — 2709999900 IR FLUORO GUIDED NEEDLE PLACEMENT

## 2021-11-11 PROCEDURE — 77002 NEEDLE LOCALIZATION BY XRAY: CPT

## 2021-11-11 PROCEDURE — 20610 DRAIN/INJ JOINT/BURSA W/O US: CPT | Performed by: RADIOLOGY

## 2021-11-11 PROCEDURE — 20610 DRAIN/INJ JOINT/BURSA W/O US: CPT

## 2021-11-11 PROCEDURE — 2500000003 HC RX 250 WO HCPCS: Performed by: RADIOLOGY

## 2021-11-11 PROCEDURE — 6360000004 HC RX CONTRAST MEDICATION: Performed by: RADIOLOGY

## 2021-11-11 PROCEDURE — 6360000002 HC RX W HCPCS: Performed by: ORTHOPAEDIC SURGERY

## 2021-11-11 PROCEDURE — 77002 NEEDLE LOCALIZATION BY XRAY: CPT | Performed by: RADIOLOGY

## 2021-11-11 RX ORDER — LIDOCAINE HYDROCHLORIDE 10 MG/ML
INJECTION, SOLUTION INFILTRATION; PERINEURAL
Status: COMPLETED | OUTPATIENT
Start: 2021-11-11 | End: 2021-11-11

## 2021-11-11 RX ORDER — LIDOCAINE HYDROCHLORIDE 20 MG/ML
INJECTION, SOLUTION INFILTRATION; PERINEURAL
Status: COMPLETED | OUTPATIENT
Start: 2021-11-11 | End: 2021-11-11

## 2021-11-11 RX ORDER — TRIAMCINOLONE ACETONIDE 40 MG/ML
INJECTION, SUSPENSION INTRA-ARTICULAR; INTRAMUSCULAR
Status: COMPLETED | OUTPATIENT
Start: 2021-11-11 | End: 2021-11-11

## 2021-11-11 RX ORDER — BUPIVACAINE HYDROCHLORIDE 2.5 MG/ML
INJECTION, SOLUTION EPIDURAL; INFILTRATION; INTRACAUDAL
Status: COMPLETED | OUTPATIENT
Start: 2021-11-11 | End: 2021-11-11

## 2021-11-11 RX ADMIN — IOPAMIDOL 1 ML: 755 INJECTION, SOLUTION INTRAVENOUS at 16:49

## 2021-11-11 RX ADMIN — LIDOCAINE HYDROCHLORIDE 3 ML: 20 INJECTION, SOLUTION INFILTRATION; PERINEURAL at 16:33

## 2021-11-11 RX ADMIN — TRIAMCINOLONE ACETONIDE 40 MG: 40 INJECTION, SUSPENSION INTRA-ARTICULAR; INTRAMUSCULAR at 16:35

## 2021-11-11 RX ADMIN — LIDOCAINE HYDROCHLORIDE 1 ML: 10 INJECTION, SOLUTION INFILTRATION; PERINEURAL at 16:37

## 2021-11-11 RX ADMIN — BUPIVACAINE HYDROCHLORIDE 1 ML: 2.5 INJECTION, SOLUTION EPIDURAL; INFILTRATION; INTRACAUDAL; PERINEURAL at 16:37

## 2021-11-11 NOTE — PROGRESS NOTES
Pt. Taken to angio room for right hip injection  Daughter signed consent  Patient placed supine on angio table for right hip injection. Area prepped and draped   Dr. Adela Gresham II in to start procedure   ordered medications injected with fluroscopy guidance. Procedure completed     Site cleansed and dressed with band aid. Pt.  Assisted off table safely  Dc home per w/discharge instructions

## 2022-04-21 ENCOUNTER — TELEPHONE (OUTPATIENT)
Dept: ADMINISTRATIVE | Age: 62
End: 2022-04-21

## 2022-04-25 NOTE — TELEPHONE ENCOUNTER
Pt last seen 9/1/2021; (right hip pain  no imaging / Patient called re:  rt foot injury/pain not subsiding     Lt heel pain. No new injury. 2nd issue: Requesting IR low back/rt hip pain. Routing to providers for guidance/scheduling rec.

## 2022-05-17 DIAGNOSIS — M25.551 RIGHT HIP PAIN: Primary | ICD-10-CM

## 2022-05-17 DIAGNOSIS — R52 PAIN: ICD-10-CM

## 2022-05-25 ENCOUNTER — HOSPITAL ENCOUNTER (OUTPATIENT)
Dept: GENERAL RADIOLOGY | Age: 62
Discharge: HOME | End: 2022-05-27
Payer: MEDICARE

## 2022-05-25 ENCOUNTER — OFFICE VISIT (OUTPATIENT)
Dept: ORTHOPEDIC SURGERY | Age: 62
End: 2022-05-25
Payer: MEDICARE

## 2022-05-25 DIAGNOSIS — M70.61 GREATER TROCHANTERIC BURSITIS OF RIGHT HIP: ICD-10-CM

## 2022-05-25 DIAGNOSIS — M16.11 PRIMARY OSTEOARTHRITIS OF RIGHT HIP: Primary | ICD-10-CM

## 2022-05-25 DIAGNOSIS — M77.32 HEEL SPUR, LEFT: ICD-10-CM

## 2022-05-25 DIAGNOSIS — M25.551 RIGHT HIP PAIN: ICD-10-CM

## 2022-05-25 DIAGNOSIS — R52 PAIN: ICD-10-CM

## 2022-05-25 PROCEDURE — 99212 OFFICE O/P EST SF 10 MIN: CPT

## 2022-05-25 PROCEDURE — 3017F COLORECTAL CA SCREEN DOC REV: CPT | Performed by: PHYSICIAN ASSISTANT

## 2022-05-25 PROCEDURE — G8421 BMI NOT CALCULATED: HCPCS | Performed by: PHYSICIAN ASSISTANT

## 2022-05-25 PROCEDURE — 99212 OFFICE O/P EST SF 10 MIN: CPT | Performed by: PHYSICIAN ASSISTANT

## 2022-05-25 PROCEDURE — 99214 OFFICE O/P EST MOD 30 MIN: CPT | Performed by: PHYSICIAN ASSISTANT

## 2022-05-25 PROCEDURE — G8427 DOCREV CUR MEDS BY ELIG CLIN: HCPCS | Performed by: PHYSICIAN ASSISTANT

## 2022-05-25 PROCEDURE — 73630 X-RAY EXAM OF FOOT: CPT

## 2022-05-25 PROCEDURE — 1036F TOBACCO NON-USER: CPT | Performed by: PHYSICIAN ASSISTANT

## 2022-05-25 PROCEDURE — 73502 X-RAY EXAM HIP UNI 2-3 VIEWS: CPT

## 2022-05-25 NOTE — PROGRESS NOTES
Patient here for a follow up on right hip and left heel pain. Patient states that the back of the left foot by the heel is just in a lot of pain, and that pain started after her last OV on 09/29/2021. Patient denies any recent falls or injuries. Patient states that the right hip is the same pain since her last visit. She states that after she had the IR injection done in November, she was feeling good and had no pain for a couple months. Patient states that since the injection has worn off, she is having difficulty getting in and out of chairs and trouble walking.        Electronically signed by Diego Rebollar MA on 5/25/2022 at 2:14 PM

## 2022-05-25 NOTE — PATIENT INSTRUCTIONS
You were ordered IR injection to your right hip by your Orthopedic Provider. If you do not hear from that department please contact: IR [phone]- [phone]    We can repeat injections to your R hip every 3 months if needed.  Please call the office if still having back pain after your injection and we can refer you to a spine specialist.

## 2022-05-25 NOTE — PROGRESS NOTES
Chief Complaint   Patient presents with    Follow-up     right hip and left heel         Subjective:  Gilbert Barker is following up in the office for right hip osteoarthritis. She last received a cortisone injection by interventional radiology about 6 months ago which lasted for several months before gradually wearing off. This gave her significant pain relief. Also states that she has chronic back pain and states that 1 previous right hip injection relieved a lot of her back pain, but states another injection helped with her hip pain, but not her back pain. She has not seen neurosurgery or physical medicine and rehab. Also complaining of left heel pain that is been chronic. She is still full weightbearing bilateral lower extremities. Denies radiculopathy or paresthesias bilaterally. No recent injuries. Review of Systems -  all pertinent positives and negatives in HPI. Objective:    General: Alert and oriented X 3, normocephalic atraumatic, external ears and eye normal, sclera clear, no acute distress, respirations easy and unlabored with no audible wheezes, skin warm and dry, speech and dress appropriate for noted age, affect euthymic. Extremity:  Bilateral Lower Extremity  Skin clean dry and intact, without signs of infection  Mild TTP globally at the L heel, nontender throughout the remainder of the foot and ankle   Mild TTP globally at the R hip, more so to posterior R hip with increased tenderness with passive IR. nontender to passive ER   No edema noted  Compartments supple throughout thigh and leg  Calf supple and nontender  Demonstrates active knee flexion/extension, ankle plantar/dorsiflexion/great toe extension. States sensation intact to touch in sural/deep peroneal/superficial peroneal/saphenous/posterior tibial nerve distributions to foot/ankle. Palpable dorsalis pedis and posterior tibialis pulses, cap refill brisk in toes, foot warm/perfused.              XR:   3 views of R hip and pelvis demonstrating mild R hip degenerative changes. No significant change in alignment. No acute fractures or dislocations or any other osseus abnormality identified. 3 views of R foot demonstrating calcaneal spurring, but otherwise no acute pathologic process. No acute fractures or dislocations or any other osseus abnormality identified. Assessment:   Diagnosis Orders   1. Primary osteoarthritis of right hip  IR FLUORO GUIDED NEEDLE PLACEMENT   2. Greater trochanteric bursitis of right hip     3. Heel spur, left  External Referral To Podiatry       Plan:   Reviewed x-rays with patient today in office    Weightbearing as tolerated bilateral lower extremities   Referral to IR for intra-articular right hip CSI today.  Please call the office if still having lumbar back pain after right hip injection we can refer to either [facility] or neurosurgery   Patient has left heel spurs. Patient given referral to podiatry. Follow up PRN    Electronically signed by Óscar Westfall PA-C on 5/25/2022 at 2:51 PM  Note: This report was completed using [employer] voiced recognition software. Every effort has been made to ensure accuracy; however, inadvertent computerized transcription errors may be present.

## 2022-06-03 ENCOUNTER — HOSPITAL ENCOUNTER (OUTPATIENT)
Dept: INTERVENTIONAL RADIOLOGY/VASCULAR | Age: 62
Discharge: HOME | End: 2022-06-05
Payer: MEDICARE

## 2022-06-03 DIAGNOSIS — M16.11 PRIMARY OSTEOARTHRITIS OF RIGHT HIP: ICD-10-CM

## 2022-06-03 PROCEDURE — 2500000003 HC RX 250 WO HCPCS: Performed by: RADIOLOGY

## 2022-06-03 PROCEDURE — 20610 DRAIN/INJ JOINT/BURSA W/O US: CPT | Performed by: RADIOLOGY

## 2022-06-03 PROCEDURE — 2709999900 IR FLUORO GUIDED NEEDLE PLACEMENT

## 2022-06-03 PROCEDURE — 6360000002 HC RX W HCPCS: Performed by: RADIOLOGY

## 2022-06-03 PROCEDURE — 6360000004 HC RX CONTRAST MEDICATION: Performed by: RADIOLOGY

## 2022-06-03 PROCEDURE — 77002 NEEDLE LOCALIZATION BY XRAY: CPT | Performed by: RADIOLOGY

## 2022-06-03 PROCEDURE — 77002 NEEDLE LOCALIZATION BY XRAY: CPT

## 2022-06-03 PROCEDURE — 20610 DRAIN/INJ JOINT/BURSA W/O US: CPT

## 2022-06-03 RX ORDER — LIDOCAINE HYDROCHLORIDE 10 MG/ML
INJECTION, SOLUTION INFILTRATION; PERINEURAL
Status: COMPLETED | OUTPATIENT
Start: 2022-06-03 | End: 2022-06-03

## 2022-06-03 RX ORDER — LIDOCAINE HYDROCHLORIDE 20 MG/ML
INJECTION, SOLUTION INFILTRATION; PERINEURAL
Status: COMPLETED | OUTPATIENT
Start: 2022-06-03 | End: 2022-06-03

## 2022-06-03 RX ORDER — BUPIVACAINE HYDROCHLORIDE 2.5 MG/ML
INJECTION, SOLUTION EPIDURAL; INFILTRATION; INTRACAUDAL
Status: COMPLETED | OUTPATIENT
Start: 2022-06-03 | End: 2022-06-03

## 2022-06-03 RX ORDER — TRIAMCINOLONE ACETONIDE 40 MG/ML
INJECTION, SUSPENSION INTRA-ARTICULAR; INTRAMUSCULAR
Status: COMPLETED | OUTPATIENT
Start: 2022-06-03 | End: 2022-06-03

## 2022-06-03 RX ADMIN — IOPAMIDOL 1 ML: 755 INJECTION, SOLUTION INTRAVENOUS at 13:52

## 2022-06-03 RX ADMIN — TRIAMCINOLONE ACETONIDE 40 MG: 40 INJECTION, SUSPENSION INTRA-ARTICULAR; INTRAMUSCULAR at 13:40

## 2022-06-03 RX ADMIN — BUPIVACAINE HYDROCHLORIDE 1 ML: 2.5 INJECTION, SOLUTION EPIDURAL; INFILTRATION; INTRACAUDAL; PERINEURAL at 13:40

## 2022-06-03 RX ADMIN — LIDOCAINE HYDROCHLORIDE 1 ML: 10 INJECTION, SOLUTION INFILTRATION; PERINEURAL at 13:40

## 2022-06-03 RX ADMIN — LIDOCAINE HYDROCHLORIDE 5 ML: 20 INJECTION, SOLUTION INFILTRATION; PERINEURAL at 13:39

## 2022-06-03 NOTE — BRIEF OP NOTE
Brief Postoperative Note    Lannie Boast  YOB: 1960  [id number]    Pre-operative Diagnosis and Procedure: right hip pain plan injection    Post-operative Diagnosis: Same    Anesthesia: Local    Estimated Blood Loss: < 10 cc    Surgeon: Leonarda BULLARD     Complications: none    Specimen obtained: none     Findings: none     Rachid Williamson II, MD   6/3/2022 1:34 PM

## 2022-06-03 NOTE — H&P
, Rfl:     gabapentin (NEURONTIN) 300 MG capsule, Take 300 mg by mouth 3 times daily . , Disp: , Rfl:     Ascorbic Acid (VITAMIN C) 500 MG tablet, Take 500 mg by mouth daily. , Disp: , Rfl:     vitamin D (CHOLECALCIFEROL) 1000 UNIT TABS tablet, Take 1,500 Units by mouth daily. , Disp: , Rfl:     Omega-3 Fatty Acids (FISH OIL) 1000 MG CAPS, Take 3,000 mg by mouth 3 times daily. , Disp: , Rfl:     levothyroxine (SYNTHROID) 25 MCG tablet, Take 25 mcg by mouth Daily. , Disp: , Rfl:     LORazepam (ATIVAN) 0.5 MG tablet, Take 0.5 mg by mouth every 6 hours as needed. , Disp: , Rfl:     lisinopril (PRINIVIL;ZESTRIL) 20 MG tablet, Take 20 mg by mouth daily. , Disp: , Rfl:     docusate sodium 100 MG CAPS, Take 100 mg by mouth 2 times daily. , Disp: 30 capsule, Rfl: 2    famotidine (PEPCID) 20 MG tablet, Take 1 tablet by mouth 2 times daily. (Patient taking differently: Take 20 mg by mouth daily ), Disp: 30 tablet, Rfl: 2    metoprolol (LOPRESSOR) 25 MG tablet, Take 25 mg by mouth 2 times daily. , Disp: , Rfl:     furosemide (LASIX) 40 MG tablet, Take 40 mg by mouth daily. , Disp: , Rfl:     Allergies   Allergen Reactions    Novocain [Procaine] Palpitations     At dentist with tooth extraction.        Past Medical History:   Diagnosis Date    Anxiety     Arthritis     Chronic bilateral low back pain without sciatica 1/23/2018    Constipation     Depression     Diverticulosis     GERD (gastroesophageal reflux disease)     Hypertension     Hypothyroidism     Neuropathy     right side upper and lower    Obesity        Past Surgical History:   Procedure Laterality Date    APPENDECTOMY      BLADDER SURGERY      CHOLECYSTECTOMY      COLON SURGERY      polyps removed    COLONOSCOPY      polyp removed    EYE SURGERY Left     macular hole     FRACTURE SURGERY  3/18/14    right subtalar fusion with right iliac crest bone graft    HERNIA REPAIR      OTHER SURGICAL HISTORY  6/2/13    ext fix rt leg orif rt leg    ROTATOR CUFF REPAIR Right 2012    TIBIA FRACTURE SURGERY Right 06/06/2013    ORIF PLATEAU    TONSILLECTOMY      TUBAL LIGATION         Family History   Problem Relation Age of Onset    Cancer Mother     High Blood Pressure Mother     High Blood Pressure Father     Heart Disease Father     Heart Disease Sister        Social History     Socioeconomic History    Marital status:      Spouse name: Not on file    Number of children: Not on file    Years of education: Not on file    Highest education level: Not on file   Occupational History    Not on file   Tobacco Use    Smoking status: Former Smoker     Packs/day: 1.50     Start date: 6/1/2013    Smokeless tobacco: Never Used    Tobacco comment: Haven't smoked since accident. Substance and Sexual Activity    Alcohol use: No     Comment: occasionally    Drug use: No    Sexual activity: Never   Other Topics Concern    Not on file   Social History Narrative    Not on file     Social Determinants of Health     Financial Resource Strain:     Difficulty of Paying Living Expenses: Not on file   Food Insecurity:     Worried About Running Out of Food in the Last Year: Not on file    Jorje of Food in the Last Year: Not on file   Transportation Needs:     Lack of Transportation (Medical): Not on file    Lack of Transportation (Non-Medical):  Not on file   Physical Activity:     Days of Exercise per Week: Not on file    Minutes of Exercise per Session: Not on file   Stress:     Feeling of Stress : Not on file   Social Connections:     Frequency of Communication with Friends and Family: Not on file    Frequency of Social Gatherings with Friends and Family: Not on file    Attends Taoism Services: Not on file    Active Member of Clubs or Organizations: Not on file    Attends Club or Organization Meetings: Not on file    Marital Status: Not on file   Intimate Partner Violence:     Fear of Current or Ex-Partner: Not on file   [employer] Emotionally Abused: Not on file    Physically Abused: Not on file    Sexually Abused: Not on file   Housing Stability:     Unable to Pay for Housing in the Last Year: Not on file    Number of Places Lived in the Last Year: Not on file    Unstable Housing in the Last Year: Not on file       ROS: Non-contributory other than as noted above    PHYSICAL EXAM:      Heart and Lungs:  demonstrate no contraindications to proceed    DATA:  CBC:   Lab Results   Component Value Date    WBC 14.1 03/20/2014    RBC 3.88 03/20/2014    HGB 11.1 03/20/2014    HCT 32.8 03/20/2014    MCV 84.5 03/20/2014    MCH 28.7 03/20/2014    MCHC 33.9 03/20/2014    RDW 15.1 03/20/2014     03/20/2014    MPV 7.9 03/20/2014     CBC with Differential:    Lab Results   Component Value Date    WBC 14.1 03/20/2014    RBC 3.88 03/20/2014    HGB 11.1 03/20/2014    HCT 32.8 03/20/2014     03/20/2014    MCV 84.5 03/20/2014    MCH 28.7 03/20/2014    MCHC 33.9 03/20/2014    RDW 15.1 03/20/2014    SEGSPCT 72 06/11/2013    LYMPHOPCT 20 06/11/2013    MONOPCT 6 06/11/2013    BASOPCT 1 06/11/2013    MONOSABS 0.87 06/11/2013    LYMPHSABS 2.90 06/11/2013    EOSABS 0.25 06/11/2013    BASOSABS 0.09 06/11/2013     Platelets:    Lab Results   Component Value Date     03/20/2014     BUN/Creatinine:    Lab Results   Component Value Date    BUN 14 03/18/2014    CREATININE 0.6 03/18/2014       ASSESSMENT AND PLAN:  1.   right hip pain plan injection  2. Procedure options, risks and benefits reviewed with patient. Patient expresses understanding.     Electronically signed by Ovi Baxter II, MD on 6/3/2022 at 1:35 PM